# Patient Record
Sex: FEMALE | Race: WHITE | HISPANIC OR LATINO | Employment: UNEMPLOYED | URBAN - METROPOLITAN AREA
[De-identification: names, ages, dates, MRNs, and addresses within clinical notes are randomized per-mention and may not be internally consistent; named-entity substitution may affect disease eponyms.]

---

## 2019-10-16 LAB
ABO + RH BLD: NORMAL
C TRACH RRNA SPEC QL PROBE: NEGATIVE
HBV SURFACE AG SERPL QL IA: NEGATIVE
HIV 1+2 AB+HIV1 P24 AG SERPL QL IA: NEGATIVE
INDIRECT COOMBS: NEGATIVE
INDIRECT COOMBS: NEGATIVE
N GONORRHOEAE, AMPLIFIED DNA: NEGATIVE
RPR: NON REACTIVE
RUBELLA IMMUNE STATUS: NORMAL

## 2019-11-09 ENCOUNTER — HOSPITAL ENCOUNTER (EMERGENCY)
Facility: HOSPITAL | Age: 34
Discharge: HOME | End: 2019-11-10
Attending: EMERGENCY MEDICINE
Payer: MEDICAID

## 2019-11-09 DIAGNOSIS — J01.90 ACUTE NON-RECURRENT SINUSITIS, UNSPECIFIED LOCATION: Primary | ICD-10-CM

## 2019-11-09 DIAGNOSIS — J20.9 ACUTE BRONCHITIS, UNSPECIFIED ORGANISM: ICD-10-CM

## 2019-11-09 LAB
DEPRECATED S PYO AG THROAT QL EIA: NEGATIVE
INFLUENZA A, MOLECULAR: NEGATIVE
INFLUENZA B, MOLECULAR: NEGATIVE
SPECIMEN SOURCE: NORMAL

## 2019-11-09 PROCEDURE — 99282 EMERGENCY DEPT VISIT SF MDM: CPT

## 2019-11-09 PROCEDURE — 87502 INFLUENZA DNA AMP PROBE: CPT

## 2019-11-09 PROCEDURE — 87081 CULTURE SCREEN ONLY: CPT

## 2019-11-09 PROCEDURE — 87880 STREP A ASSAY W/OPTIC: CPT

## 2019-11-10 VITALS
HEART RATE: 89 BPM | SYSTOLIC BLOOD PRESSURE: 95 MMHG | BODY MASS INDEX: 23.29 KG/M2 | WEIGHT: 126.56 LBS | OXYGEN SATURATION: 99 % | RESPIRATION RATE: 16 BRPM | HEIGHT: 62 IN | TEMPERATURE: 99 F | DIASTOLIC BLOOD PRESSURE: 59 MMHG

## 2019-11-10 RX ORDER — ALBUTEROL SULFATE 90 UG/1
1-2 AEROSOL, METERED RESPIRATORY (INHALATION) EVERY 4 HOURS PRN
Qty: 1 INHALER | Refills: 0 | Status: SHIPPED | OUTPATIENT
Start: 2019-11-10 | End: 2019-11-20

## 2019-11-10 RX ORDER — CEFDINIR 300 MG/1
300 CAPSULE ORAL 2 TIMES DAILY
Qty: 14 CAPSULE | Refills: 0 | Status: SHIPPED | OUTPATIENT
Start: 2019-11-10 | End: 2019-11-17

## 2019-11-10 NOTE — ED PROVIDER NOTES
Encounter Date: 11/9/2019       History     Chief Complaint   Patient presents with    Cough    Sore Throat     33-year-old female who is approximately 5 months pregnant presents emergency department with a sore throat and cough.  Patient states that she has had the symptoms for the past week.  Additionally she has sinus congestion.  She denies any fevers or chills. Her daughter has similar symptoms.  She has not been exposed to the flu.  She did not get her influenza vaccine this year.  She does not smoke cigarettes.  She denies any leg swelling or pleuritic chest pain. From an OB standpoint, she has not have any abdominal pain, no vaginal bleeding or discharge. No reported problems with this pregnancy.     [id number] use for communication        Review of patient's allergies indicates:   Allergen Reactions    Amoxicillin Rash     No past medical history on file.  No past surgical history on file.  No family history on file.  Social History     Tobacco Use    Smoking status: Not on file   Substance Use Topics    Alcohol use: Not on file    Drug use: Not on file     Review of Systems   Constitutional: Negative for chills, diaphoresis, fatigue and fever.   HENT: Positive for congestion and sore throat.    Eyes: Negative for visual disturbance.   Respiratory: Positive for cough. Negative for shortness of breath, wheezing and stridor.    Cardiovascular: Negative for chest pain and palpitations.   Gastrointestinal: Negative for abdominal distention, diarrhea, nausea and vomiting.   Genitourinary: Negative for dysuria, frequency, hematuria and urgency.   Musculoskeletal: Negative for back pain.   Skin: Negative for pallor and rash.   Neurological: Negative for weakness, light-headedness, numbness and headaches.   Psychiatric/Behavioral: Negative for confusion.   All other systems reviewed and are negative.      Physical Exam     Initial Vitals [11/09/19 2213]   BP Pulse Resp Temp SpO2   119/85 91 16 98.3 °F  (36.8 °C) 99 %      MAP       --         Physical Exam    Nursing note and vitals reviewed.  Constitutional: She appears well-developed and well-nourished. No distress.   HENT:   Head: Normocephalic and atraumatic.   Dull TMs bilaterally with serous effusion that is worse in the right, erythematous posterior pharynx without edema or exudates   Eyes: EOM are normal.   Neck: Normal range of motion. Neck supple.   No meningismus   Cardiovascular: Normal rate, regular rhythm, normal heart sounds and intact distal pulses.   No murmur heard.  Pulmonary/Chest: Breath sounds normal. She has no wheezes. She has no rhonchi. She has no rales.   Bronchospastic cough   Abdominal: Soft. There is no tenderness.   Musculoskeletal: Normal range of motion. She exhibits no edema.   Lymphadenopathy:     She has no cervical adenopathy.   Neurological: She is alert. She has normal strength. GCS score is 15. GCS eye subscore is 4. GCS verbal subscore is 5. GCS motor subscore is 6.   Skin: Skin is warm and dry. Capillary refill takes less than 2 seconds. No rash noted.   Psychiatric: She has a normal mood and affect.         ED Course   Procedures  Labs Reviewed   THROAT SCREEN, RAPID   CULTURE, STREP A,  THROAT   INFLUENZA A AND B ANTIGEN    Narrative:     Specimen Source->Nasopharyngeal Swab          Imaging Results    None          Medical Decision Making:   ED Management:  33-year-old female presents with upper respiratory symptoms.  Lungs clear.  No indication for chest x-ray.  Flu and strep were negative. Patient has components of both sinusitis and bronchitis.  She has an amoxicillin allergy, thus will treat with cefdinir.  Albuterol inhaler for cough suppression.  OTC antihistamine.  Follow up with OB.  Detailed return precautions were discussed.    Alejandra Banks MD  Emergency Medicine  11/10/2019 12:58 AM                                   Clinical Impression:       ICD-10-CM ICD-9-CM   1. Acute non-recurrent sinusitis,  unspecified location J01.90 461.9   2. Acute bronchitis, unspecified organism J20.9 466.0                             Alejandra Banks MD  11/10/19 0058

## 2019-11-10 NOTE — DISCHARGE INSTRUCTIONS
Take antibiotics as prescribed.  Additionally you may use the albuterol inhaler every 4 hr to help with your cough.  Take an over-the-counter antihistamine such as Zyrtec or Claritin daily to help relieve the fluid in your ears.  Return for worsening symptoms.

## 2019-11-11 LAB — BACTERIA THROAT CULT: NORMAL

## 2020-02-26 ENCOUNTER — HOSPITAL ENCOUNTER (OUTPATIENT)
Facility: HOSPITAL | Age: 35
Discharge: HOME | End: 2020-02-27
Attending: OBSTETRICS & GYNECOLOGY | Admitting: OBSTETRICS & GYNECOLOGY
Payer: MEDICAID

## 2020-02-26 DIAGNOSIS — N93.9 VAGINAL BLEEDING: ICD-10-CM

## 2020-02-26 LAB
APTT PPP: 25.6 SEC (ref 23.6–33.3)
BACTERIA #/AREA URNS HPF: NEGATIVE /HPF
BASOPHILS # BLD AUTO: 0.02 K/UL (ref 0–0.2)
BASOPHILS NFR BLD: 0.2 % (ref 0–1.9)
BILIRUB UR QL STRIP: NEGATIVE
CLARITY UR: CLEAR
COLOR UR: YELLOW
DIFFERENTIAL METHOD: ABNORMAL
EOSINOPHIL # BLD AUTO: 0 K/UL (ref 0–0.5)
EOSINOPHIL NFR BLD: 0.4 % (ref 0–8)
ERYTHROCYTE [DISTWIDTH] IN BLOOD BY AUTOMATED COUNT: 13.2 % (ref 11.5–14.5)
FIBRINOGEN PPP-MCNC: 625 MG/DL (ref 162–449)
GLUCOSE UR QL STRIP: NEGATIVE
HCT VFR BLD AUTO: 34.7 % (ref 37–48.5)
HGB BLD-MCNC: 11.8 G/DL (ref 12–16)
HGB UR QL STRIP: ABNORMAL
HYALINE CASTS #/AREA URNS LPF: 2 /LPF
IMM GRANULOCYTES # BLD AUTO: 0.03 K/UL (ref 0–0.04)
IMM GRANULOCYTES NFR BLD AUTO: 0.4 % (ref 0–0.5)
INR PPP: 1
KETONES UR QL STRIP: NEGATIVE
LEUKOCYTE ESTERASE UR QL STRIP: NEGATIVE
LYMPHOCYTES # BLD AUTO: 1.8 K/UL (ref 1–4.8)
LYMPHOCYTES NFR BLD: 21.6 % (ref 18–48)
MCH RBC QN AUTO: 27.8 PG (ref 27–31)
MCHC RBC AUTO-ENTMCNC: 34 G/DL (ref 32–36)
MCV RBC AUTO: 82 FL (ref 82–98)
MICROSCOPIC COMMENT: ABNORMAL
MONOCYTES # BLD AUTO: 0.6 K/UL (ref 0.3–1)
MONOCYTES NFR BLD: 7.2 % (ref 4–15)
NEUTROPHILS # BLD AUTO: 5.9 K/UL (ref 1.8–7.7)
NEUTROPHILS NFR BLD: 70.2 % (ref 38–73)
NITRITE UR QL STRIP: NEGATIVE
NRBC BLD-RTO: 0 /100 WBC
PH UR STRIP: 7 [PH] (ref 5–8)
PLATELET # BLD AUTO: 242 K/UL (ref 150–350)
PMV BLD AUTO: 11.2 FL (ref 9.2–12.9)
PROT UR QL STRIP: NEGATIVE
PROTHROMBIN TIME: 12.5 SEC (ref 10.6–14.8)
RBC # BLD AUTO: 4.25 M/UL (ref 4–5.4)
RBC #/AREA URNS HPF: 1 /HPF (ref 0–4)
SP GR UR STRIP: 1 (ref 1–1.03)
SQUAMOUS #/AREA URNS HPF: 2 /HPF
URN SPEC COLLECT METH UR: ABNORMAL
UROBILINOGEN UR STRIP-ACNC: NEGATIVE EU/DL
WBC # BLD AUTO: 8.46 K/UL (ref 3.9–12.7)
WBC #/AREA URNS HPF: 1 /HPF (ref 0–5)

## 2020-02-26 PROCEDURE — 85025 COMPLETE CBC W/AUTO DIFF WBC: CPT

## 2020-02-26 PROCEDURE — 85384 FIBRINOGEN ACTIVITY: CPT

## 2020-02-26 PROCEDURE — 81001 URINALYSIS AUTO W/SCOPE: CPT

## 2020-02-26 PROCEDURE — 85610 PROTHROMBIN TIME: CPT

## 2020-02-26 PROCEDURE — 63600175 PHARM REV CODE 636 W HCPCS: Performed by: OBSTETRICS & GYNECOLOGY

## 2020-02-26 PROCEDURE — 85730 THROMBOPLASTIN TIME PARTIAL: CPT

## 2020-02-26 RX ORDER — BETAMETHASONE SODIUM PHOSPHATE AND BETAMETHASONE ACETATE 3; 3 MG/ML; MG/ML
12 INJECTION, SUSPENSION INTRA-ARTICULAR; INTRALESIONAL; INTRAMUSCULAR; SOFT TISSUE ONCE
Status: COMPLETED | OUTPATIENT
Start: 2020-02-26 | End: 2020-02-26

## 2020-02-26 RX ORDER — SODIUM CHLORIDE, SODIUM LACTATE, POTASSIUM CHLORIDE, CALCIUM CHLORIDE 600; 310; 30; 20 MG/100ML; MG/100ML; MG/100ML; MG/100ML
INJECTION, SOLUTION INTRAVENOUS CONTINUOUS
Status: DISCONTINUED | OUTPATIENT
Start: 2020-02-26 | End: 2020-02-27 | Stop reason: HOSPADM

## 2020-02-26 RX ADMIN — SODIUM CHLORIDE, POTASSIUM CHLORIDE, SODIUM LACTATE AND CALCIUM CHLORIDE: 600; 310; 30; 20 INJECTION, SOLUTION INTRAVENOUS at 03:02

## 2020-02-26 RX ADMIN — BETAMETHASONE ACETATE AND BETAMETHASONE SODIUM PHOSPHATE 12 MG: 3; 3 INJECTION, SUSPENSION INTRA-ARTICULAR; INTRALESIONAL; INTRAMUSCULAR; SOFT TISSUE at 04:02

## 2020-02-26 RX ADMIN — SODIUM CHLORIDE, POTASSIUM CHLORIDE, SODIUM LACTATE AND CALCIUM CHLORIDE: 600; 310; 30; 20 INJECTION, SOLUTION INTRAVENOUS at 09:02

## 2020-02-27 VITALS
WEIGHT: 136 LBS | HEART RATE: 114 BPM | RESPIRATION RATE: 18 BRPM | DIASTOLIC BLOOD PRESSURE: 69 MMHG | BODY MASS INDEX: 23.22 KG/M2 | HEIGHT: 64 IN | SYSTOLIC BLOOD PRESSURE: 102 MMHG | TEMPERATURE: 98 F

## 2020-02-27 LAB
CTP QC/QA: YES
RUPTURE OF MEMBRANE: NEGATIVE

## 2020-02-27 PROCEDURE — 63600175 PHARM REV CODE 636 W HCPCS: Performed by: OBSTETRICS & GYNECOLOGY

## 2020-02-27 PROCEDURE — 96372 THER/PROPH/DIAG INJ SC/IM: CPT

## 2020-02-27 PROCEDURE — 99211 OFF/OP EST MAY X REQ PHY/QHP: CPT | Mod: 25

## 2020-02-27 PROCEDURE — 25000003 PHARM REV CODE 250: Performed by: SPECIALIST

## 2020-02-27 RX ORDER — BETAMETHASONE SODIUM PHOSPHATE AND BETAMETHASONE ACETATE 3; 3 MG/ML; MG/ML
12 INJECTION, SUSPENSION INTRA-ARTICULAR; INTRALESIONAL; INTRAMUSCULAR; SOFT TISSUE ONCE
Status: COMPLETED | OUTPATIENT
Start: 2020-02-27 | End: 2020-02-27

## 2020-02-27 RX ORDER — ACETAMINOPHEN 325 MG/1
650 TABLET ORAL EVERY 6 HOURS PRN
Status: DISCONTINUED | OUTPATIENT
Start: 2020-02-27 | End: 2020-02-27 | Stop reason: HOSPADM

## 2020-02-27 RX ADMIN — ACETAMINOPHEN 650 MG: 325 TABLET ORAL at 02:02

## 2020-02-27 RX ADMIN — SODIUM CHLORIDE, POTASSIUM CHLORIDE, SODIUM LACTATE AND CALCIUM CHLORIDE: 600; 310; 30; 20 INJECTION, SOLUTION INTRAVENOUS at 04:02

## 2020-02-27 RX ADMIN — BETAMETHASONE ACETATE AND BETAMETHASONE SODIUM PHOSPHATE 12 MG: 3; 3 INJECTION, SUSPENSION INTRA-ARTICULAR; INTRALESIONAL; INTRAMUSCULAR; SOFT TISSUE at 08:02

## 2020-02-27 NOTE — NURSING
1920 - RN to pt bedside, pt with no complaints. HENNA well, pt up to bathroom, still having bloody red mucous with wiping. Pt feels ctx but overall comfortable.     2030 - Pt up to void, still having bloody red mucous on tissue with wiping. Denies pain at this time. Pt's  and 2 children now here with pt.    2145 - Pt up to bathroom, still has bloody red mucous with wiping. Mesh panties and pads given, bed linens brought in for family,pt voices no needs.    2255 - Pt up to bathroom, bloody mucous on tissue. Voices no other needs at this time.    0025 - Pt up to bathroom, voices no needs at this time.    0135 - Pt up to bathroom, bloody mucous on tissue with wiping.    0215 - Pt c/o slight headache, Tylenol 650mg PO given @ 0225.     0245 - Pt up to bathroom.

## 2020-02-27 NOTE — NURSING
0755 - ROM plus collected    0806 - Dr. Tuttle at bedside, Luisa  #[id number] used, POC reviewed, ROM plus results negative, vaginal exam with speculum performed by Dr. Tuttle, cervix 1/thick/high, MD gave orders to give 2nd celestone injection now and discharge patient home

## 2020-02-27 NOTE — PROGRESS NOTES
[facility]  Obstetrics  Antepartum Progress Note    Patient Name: Katja Trujillo  MRN: [id number]  Admission Date: 2020  Hospital Length of Stay: 0 days  Attending Physician: Jenny Tuttle MD  Primary Care Provider: Jenny Tuttle MD    Subjective:     Principal Problem:Vaginal bleeding    HPI:  No notes on file    Hospital Course:  No notes on file    Obstetric HPI:  35yo  who presented to the office this morning for her routine OB visit.  She doesn't speak English and complaints were communicated through her .  She reported discharge and back pain, ffn was collected and returned back as negative.  Cervix at that time was 1cm/thick    She arrives at L&D about 3pm and said she spotted after I checked her, then when she got hmoe she soaked a pad. Upon arrival, she has only light spotting.  She is jossue every 4-7 minutes.  The plan is to give betamethasone, hydrate, check coagulation labs, observe.    Her contractions have eventually subsided, but pt still has almond size spot on panties.  Coag labs and CBC are wnl.  She is uncomfortable going home, she has 2 young children to care for.  She doesn't want to come back for another NST or betamethasone tomorrow.    Shall keep her overnight, give betamethasone in the AM     Objective:     Vital Signs (Most Recent):    Vital Signs (24h Range):           There is no height or weight on file to calculate BMI.    FHT: 130'sCat 1 (reassuring)    No intake or output data in the 24 hours ending 20 1833    Cervical Exam:deferred at the hospital      Significant Labs:  Recent Lab Results       20  1545   20  1446        Appearance, UA   Clear     aPTT 25.6  Comment:  Therapeutic Range of 67.5-105.1 secs.  Equivalent to Heparin Concentration of anti-Xa 0.3-0.7 IU/ml.         Bacteria, UA   Negative     Baso # 0.02       Basophil% 0.2       Bilirubin (UA)   Negative     Color, UA   Yellow     Differential Method  Automated       Eos # 0.0       Eosinophil% 0.4       Fibrinogen 625       Glucose, UA   Negative     Gran # (ANC) 5.9       Gran% 70.2       Hematocrit 34.7       Hemoglobin 11.8       Hyaline Casts, UA   2     Immature Grans (Abs) 0.03  Comment:  Mild elevation in immature granulocytes is non specific and   can be seen in a variety of conditions including stress response,   acute inflammation, trauma and pregnancy. Correlation with other   laboratory and clinical findings is essential.         Immature Granulocytes 0.4       INR 1.0  Comment:  Coumadin Therapy:  INR: 2.0-3.0 conventional anticoagulation  INR: 2.5-3.5 intensive anticoagulation         Ketones, UA   Negative     Leukocytes, UA   Negative     Lymph # 1.8       Lymph% 21.6       MCH 27.8       MCHC 34.0       MCV 82       Microscopic Comment   SEE COMMENT  Comment:  Other formed elements not mentioned in the report are not   present in the microscopic examination.        Mono # 0.6       Mono% 7.2       MPV 11.2       NITRITE UA   Negative     nRBC 0       Occult Blood UA   2+     pH, UA   7.0     Platelets 242       Protein, UA   Negative  Comment:  Recommend a 24 hour urine protein or a urine   protein/creatinine ratio if globulin induced proteinuria is  clinically suspected.       PT 12.5       RBC 4.25       RBC, UA   1     RDW 13.2       Specific Gravity, UA   1.005     Specimen UA   Urine, Clean Catch     Squam Epithel, UA   2     UROBILINOGEN UA   Negative     WBC, UA   1     WBC 8.46                 Assessment/Plan:     34 y.o. female  at 33w3d for:    * Vaginal bleeding  Observe overnight  Continuous fetal monitoring  Probable DC in the AM          Jenny Tuttle MD  Obstetrics  [facility]

## 2020-02-27 NOTE — SUBJECTIVE & OBJECTIVE
Obstetric HPI:  33yo  who presented to the office this morning for her routine OB visit.  She doesn't speak English and complaints were communicated through her .  She reported discharge and back pain, ffn was collected and returned back as negative.  Cervix at that time was 1cm/thick    She arrives at L&D about 3pm and said she spotted after I checked her, then when she got hmoe she soaked a pad. Upon arrival, she has only light spotting.  She is jossue every 4-7 minutes.  The plan is to give betamethasone, hydrate, check coagulation labs, observe.    Her contractions have eventually subsided, but pt still has almond size spot on panties.  Coag labs and CBC are wnl.  She is uncomfortable going home, she has 2 young children to care for.  She doesn't want to come back for another NST or betamethasone tomorrow.    Shall keep her overnight, give betamethasone in the AM     Objective:     Vital Signs (Most Recent):    Vital Signs (24h Range):           There is no height or weight on file to calculate BMI.    FHT: 130'sCat 1 (reassuring)    No intake or output data in the 24 hours ending 20 1833    Cervical Exam:deferred at the hospital      Significant Labs:  Recent Lab Results       20  1545   20  1446        Appearance, UA   Clear     aPTT 25.6  Comment:  Therapeutic Range of 67.5-105.1 secs.  Equivalent to Heparin Concentration of anti-Xa 0.3-0.7 IU/ml.         Bacteria, UA   Negative     Baso # 0.02       Basophil% 0.2       Bilirubin (UA)   Negative     Color, UA   Yellow     Differential Method Automated       Eos # 0.0       Eosinophil% 0.4       Fibrinogen 625       Glucose, UA   Negative     Gran # (ANC) 5.9       Gran% 70.2       Hematocrit 34.7       Hemoglobin 11.8       Hyaline Casts, UA   2     Immature Grans (Abs) 0.03  Comment:  Mild elevation in immature granulocytes is non specific and   can be seen in a variety of conditions including stress response,   acute  inflammation, trauma and pregnancy. Correlation with other   laboratory and clinical findings is essential.         Immature Granulocytes 0.4       INR 1.0  Comment:  Coumadin Therapy:  INR: 2.0-3.0 conventional anticoagulation  INR: 2.5-3.5 intensive anticoagulation         Ketones, UA   Negative     Leukocytes, UA   Negative     Lymph # 1.8       Lymph% 21.6       MCH 27.8       MCHC 34.0       MCV 82       Microscopic Comment   SEE COMMENT  Comment:  Other formed elements not mentioned in the report are not   present in the microscopic examination.        Mono # 0.6       Mono% 7.2       MPV 11.2       NITRITE UA   Negative     nRBC 0       Occult Blood UA   2+     pH, UA   7.0     Platelets 242       Protein, UA   Negative  Comment:  Recommend a 24 hour urine protein or a urine   protein/creatinine ratio if globulin induced proteinuria is  clinically suspected.       PT 12.5       RBC 4.25       RBC, UA   1     RDW 13.2       Specific Gravity, UA   1.005     Specimen UA   Urine, Clean Catch     Squam Epithel, UA   2     UROBILINOGEN UA   Negative     WBC, UA   1     WBC 8.46

## 2020-02-27 NOTE — DISCHARGE INSTRUCTIONS
Discharge Instructions    Keep scheduled appointment!    Self Care Instructions:    Diet:  · Eat from the five basic food groups  · Fruits and proteins are good choices  · Limit fast foods and added salt/sugar  · Moderate carbonated and caffeine drinks    Hydration:  · Drink at least 8 large glasses of water a day    Kick Counts:  · After a meal, rest on your side and note the baby's movements until you have 8-10 movements in a 2 hour counting period.    · If you do not feel your baby move 8-10 times within 2 hours or you sense a change in the type or character of the baby's movement, you should come in to the hospital at once.  · Remember; your baby can sleep for 20-40 minutes at a time.      When to notify your provider:    · Vaginal bleeding like a period;  You may spot if we examined your cervix.  · If your water breaks, come to the birth center.  Note time, color and odor.  · Abdominal tenderness or pain that does not go away  · Contractions every 3 to 5 minutes for 1 to 2 hours.  True contractions move from front to back, are regular; usually get longer, stronger and closer together and do not stop if you change your position or activity.  · Any burning, urgency or frequency in relation to emptying your bladder.  · Any temperature greater than 100.4 degrees, chills, flu-like symptoms

## 2020-02-27 NOTE — HOSPITAL COURSE
33yo  who presented to the office this morning for her routine OB visit.  She doesn't speak English and complaints were communicated through her .  She reported discharge and back pain, ffn was collected and returned back as negative.  Cervix at that time was 1cm/thick    She arrives at L&D about 3pm and said she spotted after I checked her, then when she got hmoe she soaked a pad. Upon arrival, she has only light spotting.  She is jossue every 4-7 minutes.  The plan is to give betamethasone, hydrate, check coagulation labs, observe.    Her contractions have eventually subsided, but pt still has almond size spot on panties.  Coag labs and CBC are wnl.  She is uncomfortable going home, she has 2 young children to care for.  She doesn't want to come back for another NST or betamethasone tomorrow.    Shall keep her overnight, give betamethasone in the AM

## 2020-02-27 NOTE — NURSING
No IV access charted in Epic flowsheet.  Patient had an 18g in Left FA.  Removed prior to d/c.  Catheter intact.

## 2020-02-27 NOTE — NURSING
"Patient arrived to unit c/o vaginal bleeding. Was seen in office today with Dr. Tuttle    used for Communication as patient is [place] speaking and does not understand any English.    1445-Pt given room orientation, gown and collected a Urine Sample.   Pad shown to RN. Moderate/Large bleeding noted on pad from home. New pad was given. Soft Abd, non tender. Patient states that she has occasional feelings of tightness.   Denies recent sexual activity.   No noted leaking of fluid on pad, just blood.   Triage done using .    1527- Dr. Tuttle given report and orders given to obtain CBC, PTT/PT, Fibrinogen, start IV and have LR at 250 ml/hr. Celestone injection 12 mg IM 24 hours apart.   1610-Patient up to bathroom- scant amount in pad noted.  Bright Small red mucus noted on toilet tissue. New pad given .   1657- Dr. Tuttle called and updated on patient status.   1735- Dr. Tuttle called and gave orders to assess patient to see if she is comfortable being discharged and educated patient on parameters and what to look for and when to seek medical attention if and when she goes home.   1751- Patient up to bathroom. Scant amount on pad. Mucus red tinge discharge on toilet tissue. New pad given     1757- called to clarify for patient " IF she had to stay until 400 pm for next shot, can it be given early"   Clarified with Provider that patient is anxious and that there are possibly going to be transportation issues when coming back tomorrow for second Celestone shot. Patient not sure if she feels comfortable especially with the bleeding and contractions.   Cleared with Dr. Tuttle for patient to stay the night, Nurse Manager called for clarification of family staying in the room.     1815- Patient updated on status and informed using  that patient can stay. Patient thankful.    1830- On call provider, Dr. Johnston called to clarify patient diet order. Orders given for regular diet. LR to " be at 125 ml/Hr. Primary Provider will reevaluate in the am .     1853- Report given to oncoming night shift.

## 2020-02-27 NOTE — DISCHARGE SUMMARY
[facility]  Obstetrics  Discharge Summary      Patient Name: Katja Trujillo  MRN: [id number]  Admission Date: 2020  Hospital Length of Stay: 0 days  Discharge Date and Time:  2020 8:15 AM  Attending Physician: Jenny Tuttle MD   Discharging Provider: Jenny Tuttle MD   Primary Care Provider: Jenny Tuttle MD    HPI: No notes on file    FHT: 130'sCat 1 (reassuring)    * No surgery found *     Hospital Course:   33yo  who presented to the office this morning for her routine OB visit.  She doesn't speak English and complaints were communicated through her .  She reported discharge and back pain, ffn was collected and returned back as negative.  Cervix at that time was 1cm/thick    She arrives at L&D about 3pm and said she spotted after I checked her, then when she got hmoe she soaked a pad. Upon arrival, she has only light spotting.  She is jossue every 4-7 minutes.  The plan is to give betamethasone, hydrate, check coagulation labs, observe.    Her contractions have eventually subsided, but pt still has almond size spot on panties.  Coag labs and CBC are wnl.  She is uncomfortable going home, she has 2 young children to care for.  She doesn't want to come back for another NST or betamethasone tomorrow.    Shall keep her overnight, give betamethasone in the AM    Pt had sporadic contractions during the night, and bleeding continued to lighten up.  This AM, her pad had scant specs of blood on it from the last 2 hrs.  U/S shows vertex fetus, anterior placenta, HANH 14cm and ROM + is negative.  Her cervix is unchanged from yesterday at 1cm/thick.   Pt reports she has the same amount of contractions as she has at home.  She will be given her second betamethasone injection today before she leaves.  Through ARMANI , she feels comfortable to be discharged.       Final Active Diagnoses:    Diagnosis Date Noted POA    PRINCIPAL PROBLEM:  Vaginal bleeding  [N93.9] 02/26/2020 Yes      Problems Resolved During this Admission:        Labs:   CBC   Recent Labs   Lab 02/26/20  1545   WBC 8.46   HGB 11.8*   HCT 34.7*          Immunizations     None          This patient has no babies on file.  Pending Diagnostic Studies:     None          Discharged Condition: good    Disposition: Home or Self Care    Follow Up:  Follow-up Information     Jenny Tuttle MD In 2 weeks.    Specialties:  Obstetrics, Obstetrics and Gynecology, Maternal and Fetal Medicine  Contact information:  [address] [place] [address] [phone]  [phone]                 Patient Instructions:      Diet Adult Regular     Pelvic Rest     Medications:  Current Discharge Medication List      CONTINUE these medications which have NOT CHANGED    Details   albuterol (PROVENTIL/VENTOLIN HFA) 90 mcg/actuation inhaler Inhale 1-2 puffs into the lungs every 4 (four) hours as needed for Wheezing (cough). Rescue  Qty: 1 Inhaler, Refills: 0             Jenny Tuttle MD  Obstetrics  [facility]

## 2020-03-11 LAB — PRENATAL STREP B CULTURE: POSITIVE

## 2020-03-20 ENCOUNTER — ANESTHESIA EVENT (OUTPATIENT)
Dept: OBSTETRICS AND GYNECOLOGY | Facility: HOSPITAL | Age: 35
End: 2020-03-20
Payer: MEDICAID

## 2020-03-20 ENCOUNTER — HOSPITAL ENCOUNTER (INPATIENT)
Facility: HOSPITAL | Age: 35
LOS: 2 days | Discharge: HOME | End: 2020-03-22
Attending: OBSTETRICS & GYNECOLOGY | Admitting: OBSTETRICS & GYNECOLOGY
Payer: MEDICAID

## 2020-03-20 ENCOUNTER — ANESTHESIA (OUTPATIENT)
Dept: OBSTETRICS AND GYNECOLOGY | Facility: HOSPITAL | Age: 35
End: 2020-03-20
Payer: MEDICAID

## 2020-03-20 DIAGNOSIS — Z78.9 ADMITTED TO LABOR AND DELIVERY: ICD-10-CM

## 2020-03-20 DIAGNOSIS — O42.019 PRETERM PREMATURE RUPTURE OF MEMBRANES WITH ONSET OF LABOR WITHIN 24 HOURS OF RUPTURE: ICD-10-CM

## 2020-03-20 PROBLEM — N93.9 VAGINAL BLEEDING: Status: RESOLVED | Noted: 2020-02-26 | Resolved: 2020-03-20

## 2020-03-20 LAB
ABO + RH BLD: NORMAL
AMPHET+METHAMPHET UR QL: NEGATIVE
BACTERIA #/AREA URNS HPF: NEGATIVE /HPF
BARBITURATES UR QL SCN>200 NG/ML: NEGATIVE
BASOPHILS # BLD AUTO: 0.02 K/UL (ref 0–0.2)
BASOPHILS NFR BLD: 0.3 % (ref 0–1.9)
BENZODIAZ UR QL SCN>200 NG/ML: NEGATIVE
BILIRUB UR QL STRIP: NEGATIVE
BLD GP AB SCN CELLS X3 SERPL QL: NORMAL
BZE UR QL SCN: NEGATIVE
CANNABINOIDS UR QL SCN: NEGATIVE
CLARITY UR: ABNORMAL
COLOR UR: ABNORMAL
CREAT UR-MCNC: 28 MG/DL (ref 15–325)
DIFFERENTIAL METHOD: NORMAL
EOSINOPHIL # BLD AUTO: 0 K/UL (ref 0–0.5)
EOSINOPHIL NFR BLD: 0.4 % (ref 0–8)
ERYTHROCYTE [DISTWIDTH] IN BLOOD BY AUTOMATED COUNT: 13.5 % (ref 11.5–14.5)
GLUCOSE UR QL STRIP: NEGATIVE
HCT VFR BLD AUTO: 40.3 % (ref 37–48.5)
HGB BLD-MCNC: 13.3 G/DL (ref 12–16)
HGB UR QL STRIP: ABNORMAL
HYALINE CASTS #/AREA URNS LPF: 275 /LPF
IMM GRANULOCYTES # BLD AUTO: 0.02 K/UL (ref 0–0.04)
IMM GRANULOCYTES NFR BLD AUTO: 0.3 % (ref 0–0.5)
KETONES UR QL STRIP: NEGATIVE
LEUKOCYTE ESTERASE UR QL STRIP: ABNORMAL
LYMPHOCYTES # BLD AUTO: 2.4 K/UL (ref 1–4.8)
LYMPHOCYTES NFR BLD: 29.9 % (ref 18–48)
MCH RBC QN AUTO: 27.1 PG (ref 27–31)
MCHC RBC AUTO-ENTMCNC: 33 G/DL (ref 32–36)
MCV RBC AUTO: 82 FL (ref 82–98)
MICROSCOPIC COMMENT: ABNORMAL
MONOCYTES # BLD AUTO: 0.5 K/UL (ref 0.3–1)
MONOCYTES NFR BLD: 6.3 % (ref 4–15)
NEUTROPHILS # BLD AUTO: 5 K/UL (ref 1.8–7.7)
NEUTROPHILS NFR BLD: 62.8 % (ref 38–73)
NITRITE UR QL STRIP: NEGATIVE
NRBC BLD-RTO: 0 /100 WBC
OPIATES UR QL SCN: NEGATIVE
PCP UR QL SCN>25 NG/ML: NEGATIVE
PCP UR QL SCN>25 NG/ML: NEGATIVE
PH UR STRIP: 8 [PH] (ref 5–8)
PLATELET # BLD AUTO: 224 K/UL (ref 150–350)
PMV BLD AUTO: 12 FL (ref 9.2–12.9)
PROT UR QL STRIP: ABNORMAL
RBC # BLD AUTO: 4.91 M/UL (ref 4–5.4)
RBC #/AREA URNS HPF: >100 /HPF (ref 0–4)
RPR SER QL: NORMAL
SP GR UR STRIP: 1 (ref 1–1.03)
SQUAMOUS #/AREA URNS HPF: 17 /HPF
TOXICOLOGY INFORMATION: NORMAL
URN SPEC COLLECT METH UR: ABNORMAL
UROBILINOGEN UR STRIP-ACNC: NEGATIVE EU/DL
WBC # BLD AUTO: 7.96 K/UL (ref 3.9–12.7)
WBC #/AREA URNS HPF: 88 /HPF (ref 0–5)

## 2020-03-20 PROCEDURE — 86900 BLOOD TYPING SEROLOGIC ABO: CPT

## 2020-03-20 PROCEDURE — 63600175 PHARM REV CODE 636 W HCPCS: Performed by: ANESTHESIOLOGY

## 2020-03-20 PROCEDURE — 87086 URINE CULTURE/COLONY COUNT: CPT

## 2020-03-20 PROCEDURE — C1751 CATH, INF, PER/CENT/MIDLINE: HCPCS | Performed by: ANESTHESIOLOGY

## 2020-03-20 PROCEDURE — 25000003 PHARM REV CODE 250: Performed by: OBSTETRICS & GYNECOLOGY

## 2020-03-20 PROCEDURE — 63600175 PHARM REV CODE 636 W HCPCS: Performed by: OBSTETRICS & GYNECOLOGY

## 2020-03-20 PROCEDURE — 62326 NJX INTERLAMINAR LMBR/SAC: CPT | Performed by: ANESTHESIOLOGY

## 2020-03-20 PROCEDURE — 80307 DRUG TEST PRSMV CHEM ANLYZR: CPT

## 2020-03-20 PROCEDURE — 86592 SYPHILIS TEST NON-TREP QUAL: CPT

## 2020-03-20 PROCEDURE — 81001 URINALYSIS AUTO W/SCOPE: CPT

## 2020-03-20 PROCEDURE — 85025 COMPLETE CBC W/AUTO DIFF WBC: CPT

## 2020-03-20 PROCEDURE — 12000002 HC ACUTE/MED SURGE SEMI-PRIVATE ROOM

## 2020-03-20 PROCEDURE — 72200004 HC VAGINAL DELIVERY LEVEL I

## 2020-03-20 PROCEDURE — 25000003 PHARM REV CODE 250: Performed by: ANESTHESIOLOGY

## 2020-03-20 RX ORDER — CEFAZOLIN SODIUM 2 G/50ML
2 SOLUTION INTRAVENOUS ONCE
Status: COMPLETED | OUTPATIENT
Start: 2020-03-20 | End: 2020-03-20

## 2020-03-20 RX ORDER — FENTANYL/BUPIVACAINE/NS/PF 2MCG/ML-.1
PLASTIC BAG, INJECTION (ML) INJECTION CONTINUOUS PRN
Status: DISCONTINUED | OUTPATIENT
Start: 2020-03-20 | End: 2020-03-20

## 2020-03-20 RX ORDER — CALCIUM CARBONATE 200(500)MG
500 TABLET,CHEWABLE ORAL 3 TIMES DAILY PRN
Status: DISCONTINUED | OUTPATIENT
Start: 2020-03-20 | End: 2020-03-20

## 2020-03-20 RX ORDER — OXYCODONE AND ACETAMINOPHEN 5; 325 MG/1; MG/1
1 TABLET ORAL EVERY 4 HOURS PRN
Status: DISCONTINUED | OUTPATIENT
Start: 2020-03-20 | End: 2020-03-22 | Stop reason: HOSPADM

## 2020-03-20 RX ORDER — SODIUM CHLORIDE, SODIUM LACTATE, POTASSIUM CHLORIDE, CALCIUM CHLORIDE 600; 310; 30; 20 MG/100ML; MG/100ML; MG/100ML; MG/100ML
INJECTION, SOLUTION INTRAVENOUS CONTINUOUS
Status: DISCONTINUED | OUTPATIENT
Start: 2020-03-20 | End: 2020-03-20

## 2020-03-20 RX ORDER — ACETAMINOPHEN 325 MG/1
650 TABLET ORAL EVERY 6 HOURS PRN
Status: DISCONTINUED | OUTPATIENT
Start: 2020-03-20 | End: 2020-03-22 | Stop reason: HOSPADM

## 2020-03-20 RX ORDER — OXYTOCIN-SODIUM CHLORIDE 0.9% IV SOLN 30 UNIT/500ML 30-0.9/5 UT/ML-%
2 SOLUTION INTRAVENOUS CONTINUOUS
Status: DISCONTINUED | OUTPATIENT
Start: 2020-03-20 | End: 2020-03-20

## 2020-03-20 RX ORDER — IBUPROFEN 400 MG/1
800 TABLET ORAL EVERY 6 HOURS PRN
Status: DISCONTINUED | OUTPATIENT
Start: 2020-03-20 | End: 2020-03-22 | Stop reason: HOSPADM

## 2020-03-20 RX ORDER — OXYCODONE AND ACETAMINOPHEN 10; 325 MG/1; MG/1
1 TABLET ORAL EVERY 4 HOURS PRN
Status: DISCONTINUED | OUTPATIENT
Start: 2020-03-20 | End: 2020-03-22 | Stop reason: HOSPADM

## 2020-03-20 RX ORDER — SODIUM CHLORIDE 0.9 % (FLUSH) 0.9 %
10 SYRINGE (ML) INJECTION
Status: DISCONTINUED | OUTPATIENT
Start: 2020-03-20 | End: 2020-03-20

## 2020-03-20 RX ORDER — SODIUM CHLORIDE 9 MG/ML
INJECTION, SOLUTION INTRAVENOUS
Status: DISCONTINUED | OUTPATIENT
Start: 2020-03-20 | End: 2020-03-20

## 2020-03-20 RX ORDER — MEPERIDINE HYDROCHLORIDE 50 MG/ML
12.5 INJECTION INTRAMUSCULAR; INTRAVENOUS; SUBCUTANEOUS EVERY 10 MIN PRN
Status: DISCONTINUED | OUTPATIENT
Start: 2020-03-20 | End: 2020-03-20

## 2020-03-20 RX ORDER — ALBUTEROL SULFATE 90 UG/1
2 AEROSOL, METERED RESPIRATORY (INHALATION) EVERY 8 HOURS
Status: DISCONTINUED | OUTPATIENT
Start: 2020-03-20 | End: 2020-03-22

## 2020-03-20 RX ORDER — OXYCODONE HYDROCHLORIDE 5 MG/1
5 TABLET ORAL
Status: DISCONTINUED | OUTPATIENT
Start: 2020-03-20 | End: 2020-03-20

## 2020-03-20 RX ORDER — DOCUSATE SODIUM 100 MG/1
200 CAPSULE, LIQUID FILLED ORAL 2 TIMES DAILY PRN
Status: DISCONTINUED | OUTPATIENT
Start: 2020-03-20 | End: 2020-03-22 | Stop reason: HOSPADM

## 2020-03-20 RX ORDER — ONDANSETRON 4 MG/1
8 TABLET, ORALLY DISINTEGRATING ORAL EVERY 8 HOURS PRN
Status: DISCONTINUED | OUTPATIENT
Start: 2020-03-20 | End: 2020-03-22 | Stop reason: HOSPADM

## 2020-03-20 RX ORDER — HYDROMORPHONE HYDROCHLORIDE 1 MG/ML
0.2 INJECTION, SOLUTION INTRAMUSCULAR; INTRAVENOUS; SUBCUTANEOUS
Status: DISCONTINUED | OUTPATIENT
Start: 2020-03-20 | End: 2020-03-20

## 2020-03-20 RX ORDER — DIPHENHYDRAMINE HYDROCHLORIDE 50 MG/ML
12.5 INJECTION INTRAMUSCULAR; INTRAVENOUS
Status: DISCONTINUED | OUTPATIENT
Start: 2020-03-20 | End: 2020-03-20

## 2020-03-20 RX ORDER — CEFAZOLIN SODIUM 1 G/50ML
1 SOLUTION INTRAVENOUS
Status: DISCONTINUED | OUTPATIENT
Start: 2020-03-20 | End: 2020-03-20

## 2020-03-20 RX ORDER — ROPIVACAINE HYDROCHLORIDE 2 MG/ML
INJECTION, SOLUTION EPIDURAL; INFILTRATION
Status: DISCONTINUED | OUTPATIENT
Start: 2020-03-20 | End: 2020-03-20

## 2020-03-20 RX ORDER — ONDANSETRON 2 MG/ML
4 INJECTION INTRAMUSCULAR; INTRAVENOUS DAILY PRN
Status: DISCONTINUED | OUTPATIENT
Start: 2020-03-20 | End: 2020-03-20

## 2020-03-20 RX ORDER — BUTORPHANOL TARTRATE 2 MG/ML
1 INJECTION INTRAMUSCULAR; INTRAVENOUS
Status: DISCONTINUED | OUTPATIENT
Start: 2020-03-20 | End: 2020-03-20

## 2020-03-20 RX ORDER — DIPHENHYDRAMINE HCL 25 MG
25 CAPSULE ORAL EVERY 4 HOURS PRN
Status: DISCONTINUED | OUTPATIENT
Start: 2020-03-20 | End: 2020-03-22 | Stop reason: HOSPADM

## 2020-03-20 RX ORDER — AMOXICILLIN 250 MG
1 CAPSULE ORAL NIGHTLY
Status: DISCONTINUED | OUTPATIENT
Start: 2020-03-20 | End: 2020-03-22 | Stop reason: HOSPADM

## 2020-03-20 RX ORDER — CEFAZOLIN SODIUM 2 G/50ML
2 SOLUTION INTRAVENOUS ONCE
Status: DISCONTINUED | OUTPATIENT
Start: 2020-03-20 | End: 2020-03-20

## 2020-03-20 RX ORDER — HYDROCORTISONE 25 MG/G
CREAM TOPICAL 3 TIMES DAILY PRN
Status: DISCONTINUED | OUTPATIENT
Start: 2020-03-20 | End: 2020-03-22 | Stop reason: HOSPADM

## 2020-03-20 RX ORDER — PRENATAL WITH FERROUS FUM AND FOLIC ACID 3080; 920; 120; 400; 22; 1.84; 3; 20; 10; 1; 12; 200; 27; 25; 2 [IU]/1; [IU]/1; MG/1; [IU]/1; MG/1; MG/1; MG/1; MG/1; MG/1; MG/1; UG/1; MG/1; MG/1; MG/1; MG/1
1 TABLET ORAL DAILY
Status: DISCONTINUED | OUTPATIENT
Start: 2020-03-20 | End: 2020-03-22 | Stop reason: HOSPADM

## 2020-03-20 RX ORDER — ONDANSETRON 2 MG/ML
4 INJECTION INTRAMUSCULAR; INTRAVENOUS EVERY 6 HOURS PRN
Status: DISCONTINUED | OUTPATIENT
Start: 2020-03-20 | End: 2020-03-20

## 2020-03-20 RX ADMIN — BENZOCAINE AND LEVOMENTHOL: 200; 5 SPRAY TOPICAL at 05:03

## 2020-03-20 RX ADMIN — ROPIVACAINE HYDROCHLORIDE 5 ML: 2 INJECTION, SOLUTION EPIDURAL; INFILTRATION at 10:03

## 2020-03-20 RX ADMIN — SENNOSIDES AND DOCUSATE SODIUM 1 TABLET: 8.6; 5 TABLET ORAL at 10:03

## 2020-03-20 RX ADMIN — SODIUM CHLORIDE, POTASSIUM CHLORIDE, SODIUM LACTATE AND CALCIUM CHLORIDE 300 ML: 600; 310; 30; 20 INJECTION, SOLUTION INTRAVENOUS at 08:03

## 2020-03-20 RX ADMIN — SODIUM CHLORIDE, POTASSIUM CHLORIDE, SODIUM LACTATE AND CALCIUM CHLORIDE: 600; 310; 30; 20 INJECTION, SOLUTION INTRAVENOUS at 10:03

## 2020-03-20 RX ADMIN — CEFAZOLIN SODIUM 2 G: 2 SOLUTION INTRAVENOUS at 04:03

## 2020-03-20 RX ADMIN — OXYCODONE HYDROCHLORIDE AND ACETAMINOPHEN 1 TABLET: 5; 325 TABLET ORAL at 07:03

## 2020-03-20 RX ADMIN — Medication: at 05:03

## 2020-03-20 RX ADMIN — ROPIVACAINE HYDROCHLORIDE 5 ML: 2 INJECTION, SOLUTION EPIDURAL; INFILTRATION at 09:03

## 2020-03-20 RX ADMIN — SODIUM CHLORIDE, SODIUM LACTATE, POTASSIUM CHLORIDE, AND CALCIUM CHLORIDE 1000 ML: .6; .31; .03; .02 INJECTION, SOLUTION INTRAVENOUS at 09:03

## 2020-03-20 RX ADMIN — CEFAZOLIN SODIUM 1 G: 1 SOLUTION INTRAVENOUS at 12:03

## 2020-03-20 RX ADMIN — IBUPROFEN 800 MG: 400 TABLET, FILM COATED ORAL at 04:03

## 2020-03-20 RX ADMIN — Medication 2 MILLI-UNITS/MIN: at 06:03

## 2020-03-20 RX ADMIN — Medication 12 ML/HR: at 09:03

## 2020-03-20 RX ADMIN — SODIUM CHLORIDE, POTASSIUM CHLORIDE, SODIUM LACTATE AND CALCIUM CHLORIDE: 600; 310; 30; 20 INJECTION, SOLUTION INTRAVENOUS at 04:03

## 2020-03-20 NOTE — NURSING
PT TO UNIT VIA W/C FROM L&D.  KILO  USED TO COMMUNICATE WITH PT.  ID # [id number].  PT STATES UNDERSTANDING OF ALL TEACHING & PLAN OF CARE.  ARMANI VANG @ BS.

## 2020-03-20 NOTE — SUBJECTIVE & OBJECTIVE
Interval History:  Katja is a 34 y.o.  at 36w5d. She is doing well. On pitocin.    Objective:     Vital Signs (Most Recent):  Temp: 99 °F (37.2 °C) (20 07)  Pulse: 89 (20 0651)  Resp: 16 (20 07)  BP: 104/69 (20 0651) Vital Signs (24h Range):  Temp:  [98.2 °F (36.8 °C)-99 °F (37.2 °C)] 99 °F (37.2 °C)  Pulse:  [78-89] 89  Resp:  [16-18] 16  BP: ()/(61-79) 104/69     Weight: 61.2 kg (135 lb)  Body mass index is 26.37 kg/m².    FHT: 130'sCat 1 (reassuring)  TOCO:  Q 2-6 minutes    Cervical Exam:  Dilation:  3  Effacement:  80%  Station: -2  Presentation: Vertex     Significant Labs:  Lab Results   Component Value Date    GROUPTRH B POS 2020    HEPBSAG Negative 10/16/2019    STREPBCULT positive 2020       I have personallly reviewed all pertinent lab results from the last 24 hours.    Physical Exam

## 2020-03-20 NOTE — HOSPITAL COURSE
33yo  admitted with SROM at 36.5 wks. Started on pitocin. Received an epidural for her labor.She underwent  without any complications. She is GBBS positive.

## 2020-03-20 NOTE — ANESTHESIA PROCEDURE NOTES
Epidural    Patient location during procedure: OB   Reason for block: primary anesthetic   Diagnosis: IUP   Start time: 3/20/2020 9:41 AM  Timeout: 3/20/2020 9:40 AM  End time: 3/20/2020 9:51 AM    Staffing  Performing Provider: David Murray MD  Authorizing Provider: David Murray MD        Preanesthetic Checklist  Completed: patient identified, site marked, surgical consent, pre-op evaluation, timeout performed, IV checked, risks and benefits discussed, monitors and equipment checked, anesthesia consent given, hand hygiene performed and patient being monitored  Preparation  Patient position: sitting  Prep: Betadine  Patient monitoring: ECG and Blood Pressure  Epidural  Skin Anesthetic: lidocaine 1%  Skin Wheal: 3 mL  Administration type: continuous  Approach: midline  Interspace: L3-4    Injection technique: BERNABE air  Needle and Epidural Catheter  Needle type: Tuohy   Needle gauge: 17  Needle length: 3.5 inches  Needle insertion depth: 8 cm  Catheter type: springwound and multi-orifice  Catheter size: 19 G  Catheter at skin depth: 12 cm  Test dose: 3 mL of lidocaine 1.5% with Epi 1-to-200,000  Additional Documentation: no paresthesia on injection, negative aspiration for heme and CSF, no significant pain on injection and no significant complaints from patient  Needle localization: anatomical landmarks  Medications:  Volume per aspiration: 5 mL  Time between aspirations: 5 minutes  Assessment  Upper dermatomal levels - Left: T6  Right: T6   Dermatomal levels determined by alcohol wipe  Ease of block: easy  Patient's tolerance of the procedure: comfortable throughout block and no complaints  Additional Notes  Epidural dosed with:  Ropivacaine 0.2% x 5/5 mls.  No s/s of systemic injection.  No signs or symptoms of systemic injection.  No paresthesias or pain. No inadvertent dural puncture with Tuohy.  Dural puncture not performed with spinal needle

## 2020-03-20 NOTE — NURSING
Call to anesthesia MD to report patient complaints of numbness in fingers.  Will turn pump off for 30 mins then call MD back.

## 2020-03-20 NOTE — L&D DELIVERY NOTE
[facility]  Vaginal Delivery   Operative Note    SUMMARY   Patient got to complete dilation and only pushed for 1 contraction.  No episiotomy was performed.  You could see the head rotate from OP to OA position as she pushed the baby out.  After the head delivered, the shoulders cleared easily followed by the rest of the infant, which is a viable female infant.  The nares and mouth were then suctioned and the baby was handed off to the mom.  Once the cord stopped pulsating, it was clamped by me and cut by me also.  Cord blood was obtained for the nursery.  The placenta delivered in the Schultze presentation was complete and intact and had a large clot of about 300 cc on the edge of the placenta for about a 2-3% abruption.  Uterus was quite firm.  The perineum was intact with no lacerations.  Blood loss is 300cc total    Indications:  premature rupture of membranes with onset of labor within 24 hours of rupture  Pregnancy complicated by:   Patient Active Problem List   Diagnosis    Admitted to labor and delivery     premature rupture of membranes with onset of labor within 24 hours of rupture     Admitting GA: 36w5d    This patient has no babies on file.

## 2020-03-20 NOTE — PROGRESS NOTES
[facility]  Obstetrics  Labor Progress Note    Patient Name: Katja Trujillo  MRN: [id number]  Admission Date: 3/20/2020  Hospital Length of Stay: 0 days  Attending Physician: Jenny Tuttle MD  Primary Care Provider: Jenny Tuttle MD    Subjective:     Principal Problem: premature rupture of membranes with onset of labor within 24 hours of rupture    Hospital Course:  33yo  admitted with SROM at 36.5 wks. Started on pitocin.    Interval History:  Katja is a 34 y.o.  at 36w5d. She is doing well. Epidural in place . Had a small clot that came out.  Upon exam, there was still a forebag, that I used amnio hook, and had bloody fluid return.    Objective:     Vital Signs (Most Recent):  Temp: 98.3 °F (36.8 °C) (20 1300)  Pulse: 105 (20 1245)  Resp: 16 (20 1300)  BP: 114/61 (20 1245) Vital Signs (24h Range):  Temp:  [98.2 °F (36.8 °C)-99.1 °F (37.3 °C)] 98.3 °F (36.8 °C)  Pulse:  [] 105  Resp:  [16-18] 16  BP: ()/(56-80) 114/61     Weight: 61.2 kg (135 lb)  Body mass index is 26.37 kg/m².    FHT: 140's Cat 1 (reassuring) with early decels  TOCO:  Q 2-4 minutes    Cervical Exam:  Dilation:  7  Effacement:  100%  Station: -1  Presentation: Vertex     Significant Labs:  Lab Results   Component Value Date    GROUPTRH B POS 2020    HEPBSAG Negative 10/16/2019    STREPBCULT positive 2020       I have personallly reviewed all pertinent lab results from the last 24 hours.    Physical Exam    Assessment/Plan:     34 y.o. female  at 36w5d for:    *  premature rupture of membranes with onset of labor within 24 hours of rupture  IUP 36.5 wks  Completed AROM with amnio hook  Expect           Jenny Tuttle MD  Obstetrics  [facility]

## 2020-03-20 NOTE — PLAN OF CARE
POC discussed with patient;  utilized to discuss labor plans, concerns. Time allowed for questions from patient.

## 2020-03-20 NOTE — PROGRESS NOTES
[facility]  Obstetrics  Labor Progress Note    Patient Name: Kajta Trujillo  MRN: [id number]  Admission Date: 3/20/2020  Hospital Length of Stay: 0 days  Attending Physician: Jenny Tuttle MD  Primary Care Provider: Jenny Tuttle MD    Subjective:     Principal Problem: premature rupture of membranes with onset of labor within 24 hours of rupture    Hospital Course:  35yo  admitted with SROM at 36.5 wks. Started on pitocin.    Interval History:  Katja is a 34 y.o.  at 36w5d. She is doing well. On pitocin.    Objective:     Vital Signs (Most Recent):  Temp: 99 °F (37.2 °C) (20 07)  Pulse: 89 (20)  Resp: 16 (20)  BP: 104/69 (20) Vital Signs (24h Range):  Temp:  [98.2 °F (36.8 °C)-99 °F (37.2 °C)] 99 °F (37.2 °C)  Pulse:  [78-89] 89  Resp:  [16-18] 16  BP: ()/(61-79) 104/69     Weight: 61.2 kg (135 lb)  Body mass index is 26.37 kg/m².    FHT: 130'sCat 1 (reassuring)  TOCO:  Q 2-6 minutes    Cervical Exam:  Dilation:  3  Effacement:  80%  Station: -2  Presentation: Vertex     Significant Labs:  Lab Results   Component Value Date    GROUPTRH B POS 2020    HEPBSAG Negative 10/16/2019    STREPBCULT positive 2020       I have personallly reviewed all pertinent lab results from the last 24 hours.    Physical Exam    Assessment/Plan:     34 y.o. female  at 36w5d for:    *  premature rupture of membranes with onset of labor within 24 hours of rupture  IUP 36.5 wks  SROM-clear  GBBS on Ancef expect   Tubal only if C/S          Jenny Tuttle MD  Obstetrics  [facility]

## 2020-03-20 NOTE — ANESTHESIA PREPROCEDURE EVALUATION
2020  Katja Trujillo is a 34 y.o., female.  Patient Active Problem List   Diagnosis    Admitted to labor and delivery     premature rupture of membranes with onset of labor within 24 hours of rupture       History reviewed. No pertinent surgical history.     Tobacco Use:  The patient  reports that she has never smoked. She has never used smokeless tobacco.     No results found for this or any previous visit.          Lab Results   Component Value Date    WBC 7.96 2020    HGB 13.3 2020    HCT 40.3 2020    MCV 82 2020     2020     BMP  No results found for: NA, K, CL, CO2, BUN, CREATININE, CALCIUM, ANIONGAP, ESTGFRAFRICA, EGFRNONAA      Pre-op Assessment    I have reviewed the Patient Summary Reports.    I have reviewed the Nursing Notes.   I have reviewed the Medications.     Review of Systems  Anesthesia Hx:  No problems with previous Anesthesia  Denies Family Hx of Anesthesia complications.   Denies Personal Hx of Anesthesia complications.   Social:  Non-Smoker    Hematology/Oncology:  Hematology Normal       -- Coag Disorders: Bleeding Disorder (epistasis mild with blowing nose):   EENT/Dental:EENT/Dental Normal   Cardiovascular:  Cardiovascular Normal     Pulmonary:  Pulmonary Normal    Renal/:  Renal/ Normal     Hepatic/GI:  Hepatic/GI Normal    Musculoskeletal:  Musculoskeletal Normal  Musculoskeletal General/Symptoms: (report some back pain prior to pregnancy, now worse with the pregnancy)    Neurological:  Neurology Normal    Endocrine:  Endocrine Normal    Psych:  Psychiatric Normal           Physical Exam  General:  Well nourished    Airway/Jaw/Neck:  Airway Findings: Mouth Opening: Normal Tongue: Normal  General Airway Assessment: Adult  Mallampati: II  TM Distance: Normal, at least 6 cm  Jaw/Neck Findings:  Neck ROM: Normal ROM      Eyes/Ears/Nose:  Eyes/Ears/Nose Findings:    Dental:  Dental Findings: In tact   Chest/Lungs:  Chest/Lungs Findings: Clear to auscultation, Normal Respiratory Rate     Heart/Vascular:  Heart Findings: Rate: Normal  Rhythm: Regular Rhythm  Sounds: Normal     Abdomen:  Abdomen Findings:     Musculoskeletal:  Musculoskeletal Findings:    Skin:  Skin Findings:     Mental Status:  Mental Status Findings:  Cooperative, Alert and Oriented         Anesthesia Plan  Type of Anesthesia, risks & benefits discussed:  Anesthesia Type:  epidural  Patient's Preference: Epidural  Intra-op Monitoring Plan: standard ASA monitors  Intra-op Monitoring Plan Comments:   Post Op Pain Control Plan: per primary service following discharge from PACU and IV/PO Opioids PRN  Post Op Pain Control Plan Comments:   Induction:    Beta Blocker:         Informed Consent: Patient understands risks and agrees with Anesthesia plan.  Questions answered. Anesthesia consent signed with patient.  ASA Score: 2     Day of Surgery Review of History & Physical:

## 2020-03-20 NOTE — LACTATION NOTE
This note was copied from a baby's chart.  A  New Beginning booklet given and  breastfeeding chapter reviewed via  Dario. ID # [id number]. Discussed:     Supply and Demand:  The more you nurse the baby the more milk you will make.      Feed your baby On Cue at the earliest sign of hunger or need for comfort:  o Sucking on fingers or hands  o Bringing hands toward his mouth  o Rooting or reaching for something to suck on  o Sucking motions with mouth  o Fretful noises  o Crying is a late sign of hunger or comfort.   The baby should be positioned and latched on to the breast correctly  o Chest-to-chest, chin in the breast  o Babys lips are flipped outward  o Babys mouth is stretched open wide like a shout  o Babys sucking should feel like tugging to the mother  - The baby should be drinking at the breast  o You should hear an occasional swallow during the feeding  o Switch breasts when the baby takes himself off the breast or falls asleep  o Keep offering breasts until the baby looks full, no longer gives hunger signs, and stays asleep when placed on his back in the crib  - If the baby is sleepy and wont wake for a feeding, put the baby skin-to-skin dressed in a diaper against the mothers bare chest  - Sleep with your baby near you in the hospital room  - Call the nurse/lactation consultant for additional assistance as needed.   Reviewed late  chapter in booklet, safe sleep, swaddling, bulb suction.  Discussed the adequacy of colostrum.  Instructed on normal  feeding and sleeping patterns.  Encouraged mother to feed the infant on cue, a minimum of 8 times in 24 hours prior to supplementation to promote appropriate breast stimulation for adequate milk supply.  Chooses to supplement via bottle  Safely taught how to feed infant via chosen method.  Demonstrated by nurse and pt return demonstrates proper and safe usage.  States understand and provided appropriate recall of all  information.

## 2020-03-20 NOTE — SUBJECTIVE & OBJECTIVE
Interval History:  Katja is a 34 y.o.  at 36w5d. She is doing well. Epidural in place . Had a small clot that came out.  Upon exam, there was still a forebag, that I used amnio hook, and had bloody fluid return.    Objective:     Vital Signs (Most Recent):  Temp: 98.3 °F (36.8 °C) (20 1300)  Pulse: 105 (20 1245)  Resp: 16 (20 1300)  BP: 114/61 (20 1245) Vital Signs (24h Range):  Temp:  [98.2 °F (36.8 °C)-99.1 °F (37.3 °C)] 98.3 °F (36.8 °C)  Pulse:  [] 105  Resp:  [16-18] 16  BP: ()/(56-80) 114/61     Weight: 61.2 kg (135 lb)  Body mass index is 26.37 kg/m².    FHT: 140's Cat 1 (reassuring) with early decels  TOCO:  Q 2-4 minutes    Cervical Exam:  Dilation:  7  Effacement:  100%  Station: -1  Presentation: Vertex     Significant Labs:  Lab Results   Component Value Date    GROUPTRH B POS 2020    HEPBSAG Negative 10/16/2019    STREPBCULT positive 2020       I have personallly reviewed all pertinent lab results from the last 24 hours.    Physical Exam

## 2020-03-21 LAB
BASOPHILS # BLD AUTO: 0.02 K/UL (ref 0–0.2)
BASOPHILS NFR BLD: 0.2 % (ref 0–1.9)
DIFFERENTIAL METHOD: ABNORMAL
EOSINOPHIL # BLD AUTO: 0 K/UL (ref 0–0.5)
EOSINOPHIL NFR BLD: 0.2 % (ref 0–8)
ERYTHROCYTE [DISTWIDTH] IN BLOOD BY AUTOMATED COUNT: 13.7 % (ref 11.5–14.5)
HCT VFR BLD AUTO: 33.9 % (ref 37–48.5)
HGB BLD-MCNC: 11.3 G/DL (ref 12–16)
IMM GRANULOCYTES # BLD AUTO: 0.05 K/UL (ref 0–0.04)
IMM GRANULOCYTES NFR BLD AUTO: 0.4 % (ref 0–0.5)
LYMPHOCYTES # BLD AUTO: 2.7 K/UL (ref 1–4.8)
LYMPHOCYTES NFR BLD: 20.7 % (ref 18–48)
MCH RBC QN AUTO: 27.8 PG (ref 27–31)
MCHC RBC AUTO-ENTMCNC: 33.3 G/DL (ref 32–36)
MCV RBC AUTO: 83 FL (ref 82–98)
MONOCYTES # BLD AUTO: 0.8 K/UL (ref 0.3–1)
MONOCYTES NFR BLD: 6.1 % (ref 4–15)
NEUTROPHILS # BLD AUTO: 9.4 K/UL (ref 1.8–7.7)
NEUTROPHILS NFR BLD: 72.4 % (ref 38–73)
NRBC BLD-RTO: 0 /100 WBC
PLATELET # BLD AUTO: 200 K/UL (ref 150–350)
PMV BLD AUTO: 11.1 FL (ref 9.2–12.9)
RBC # BLD AUTO: 4.07 M/UL (ref 4–5.4)
WBC # BLD AUTO: 12.94 K/UL (ref 3.9–12.7)

## 2020-03-21 PROCEDURE — 99900035 HC TECH TIME PER 15 MIN (STAT)

## 2020-03-21 PROCEDURE — 94761 N-INVAS EAR/PLS OXIMETRY MLT: CPT

## 2020-03-21 PROCEDURE — 25000242 PHARM REV CODE 250 ALT 637 W/ HCPCS: Performed by: OBSTETRICS & GYNECOLOGY

## 2020-03-21 PROCEDURE — 94640 AIRWAY INHALATION TREATMENT: CPT

## 2020-03-21 PROCEDURE — 12000002 HC ACUTE/MED SURGE SEMI-PRIVATE ROOM

## 2020-03-21 PROCEDURE — 25000003 PHARM REV CODE 250: Performed by: OBSTETRICS & GYNECOLOGY

## 2020-03-21 PROCEDURE — 85025 COMPLETE CBC W/AUTO DIFF WBC: CPT

## 2020-03-21 RX ADMIN — ALBUTEROL SULFATE 2 PUFF: 90 AEROSOL, METERED RESPIRATORY (INHALATION) at 07:03

## 2020-03-21 RX ADMIN — ALBUTEROL SULFATE 2 PUFF: 90 AEROSOL, METERED RESPIRATORY (INHALATION) at 11:03

## 2020-03-21 RX ADMIN — ALBUTEROL SULFATE 2 PUFF: 90 AEROSOL, METERED RESPIRATORY (INHALATION) at 12:03

## 2020-03-21 RX ADMIN — PRENATAL VIT W/ FE FUMARATE-FA TAB 27-0.8 MG 1 TABLET: 27-0.8 TAB at 09:03

## 2020-03-21 RX ADMIN — IBUPROFEN 800 MG: 400 TABLET, FILM COATED ORAL at 07:03

## 2020-03-21 RX ADMIN — SENNOSIDES AND DOCUSATE SODIUM 1 TABLET: 8.6; 5 TABLET ORAL at 07:03

## 2020-03-21 RX ADMIN — IBUPROFEN 800 MG: 400 TABLET, FILM COATED ORAL at 03:03

## 2020-03-21 RX ADMIN — OXYCODONE HYDROCHLORIDE AND ACETAMINOPHEN 1 TABLET: 10; 325 TABLET ORAL at 04:03

## 2020-03-21 RX ADMIN — DOCUSATE SODIUM 200 MG: 100 CAPSULE, LIQUID FILLED ORAL at 09:03

## 2020-03-21 RX ADMIN — ALBUTEROL SULFATE 2 PUFF: 90 AEROSOL, METERED RESPIRATORY (INHALATION) at 03:03

## 2020-03-21 NOTE — PLAN OF CARE
Active in  care. Lochia light. Pain managed well with current medications. Voiding spontaneously. Ambulating ad minla in room. Demonstrates effective latch with breastfeeding.

## 2020-03-21 NOTE — ANESTHESIA POSTPROCEDURE EVALUATION
Anesthesia Post Evaluation    Patient: Katja Trujillo    Procedure(s) Performed: * No procedures listed *    Final Anesthesia Type: epidural    Patient location during evaluation: floor  Patient participation: Yes- Able to Participate  Level of consciousness: awake and alert, oriented and awake  Post-procedure vital signs: reviewed and stable  Pain management: adequate  Airway patency: patent    PONV status at discharge: No PONV  Anesthetic complications: no      Cardiovascular status: blood pressure returned to baseline, hemodynamically stable and stable  Respiratory status: unassisted, spontaneous ventilation and room air  Hydration status: euvolemic  Follow-up not needed.  Comments: The patient was found to be awake alert and oriented x4.  The patient conveyed that her legs feel normal at this time and was able to demonstrate good motor and sensory to her lower extremities at this time.  The patient denied headache or back ache at this time.  The epidural site was examined and found to be clean and dry without evidence of bleeding, infection or hematoma formation.  She was instructed to notify the Department of Anesthesiology with any questions, problems or concerns.  The patient demonstrates no anesthesia complications at this time          Vitals Value Taken Time   /72 3/21/2020  7:01 AM   Temp 36.7 °C (98 °F) 3/21/2020  7:01 AM   Pulse 72 3/21/2020  7:26 AM   Resp 18 3/21/2020  7:26 AM   SpO2 99 % 3/21/2020  7:26 AM         No case tracking events are documented in the log.      Pain/Harry Score: Pain Rating Prior to Med Admin: 7 (3/21/2020  4:11 AM)  Pain Rating Post Med Admin: 0 (3/20/2020  8:40 PM)  Harry Score: 10 (3/20/2020  3:25 PM)

## 2020-03-21 NOTE — PROGRESS NOTES
Postpartum Day 1 Vaginal Delivery    Katja Trujillo is doing well without complaints. She denies any problems with pp blues, is ambulating well, is voiding without difficulty, and states bleeding is not heavy. Her pain level is being controlled with pain meds.      OBJECTIVE:     Vital Signs (Most Recent):  /72 (BP Location: Right arm, Patient Position: Lying)   Pulse 72   Temp 98 °F (36.7 °C) (Oral)   Resp 18   Ht 5' (1.524 m)   Wt 61.2 kg (135 lb)   LMP 2019 (Exact Date)   SpO2 99%   Breastfeeding? Yes   BMI 26.37 kg/m²       Vital Signs Range (Last 24H):  Reviewed    I & O (Last 24H):  Intake/Output Summary (Last 24 hours)    Intake/Output Summary (Last 24 hours) at 3/21/2020 0850  Last data filed at 3/20/2020 1730  Gross per 24 hour   Intake --   Output 2460 ml   Net -2460 ml         Physical Exam:    Abd: soft non tender, fundus firm   Ext: negative chevy's sign, minimal edema  Lochia: appropriate amount rubra      CBC:   Lab Results   Component Value Date/Time    WBC 12.94 (H) 2020 04:25 AM    RBC 4.07 2020 04:25 AM    HGB 11.3 (L) 2020 04:25 AM    HCT 33.9 (L) 2020 04:25 AM     2020 04:25 AM    MCV 83 2020 04:25 AM    MCH 27.8 2020 04:25 AM    MCHC 33.3 2020 04:25 AM       ABO/Rh  B POS          ASSESSMENT/PLAN:     S/p vaginal delivery   Patient Active Problem List   Diagnosis    Admitted to labor and delivery     premature rupture of membranes with onset of labor within 24 hours of rupture        Routine postpartal care   If baby ready for discharge may consider discharge this p.m.

## 2020-03-21 NOTE — LACTATION NOTE
This note was copied from a baby's chart.  Mom reports baby feeding and supplementing with formula, denies pain or nipple pain, baby not in room at this time, assisted offered as needed, instructed to call when needed. Verb understanding.

## 2020-03-21 NOTE — PLAN OF CARE
Patient medicated x 2 this shift for pain. Minimal bleeding. Voiding without difficulty.Instructed patient on several occasions to call prior to feedings.Luisa used for communication. Plan of care continued.

## 2020-03-21 NOTE — PLAN OF CARE
03/21/20 0726   Patient Assessment/Suction   Level of Consciousness (AVPU) alert   Respiratory Effort Normal;Unlabored   All Lung Fields Breath Sounds clear   PRE-TX-O2   O2 Device (Oxygen Therapy) room air   SpO2 99 %   Pulse Oximetry Type Intermittent   $ Pulse Oximetry - Multiple Charge Pulse Oximetry - Multiple   Pulse 72   Resp 18   Inhaler   $ Inhaler Charges MDI (Metered Dose Inahler) Treatment   Daily Review of Necessity (Inhaler) completed   Respiratory Treatment Status (Inhaler) given   Treatment Route (Inhaler) spacer/holding chamber   Patient Position (Inhaler) HOB elevated   Post Treatment Assessment (Inhaler) breath sounds improved   Signs of Intolerance (Inhaler) none   Respiratory Evaluation   $ Care Plan Tech Time 15 min

## 2020-03-21 NOTE — CARE UPDATE
03/21/20 0042   Patient Assessment/Suction   Level of Consciousness (AVPU) alert   Respiratory Effort Normal;Unlabored   Expansion/Accessory Muscles/Retractions no use of accessory muscles   All Lung Fields Breath Sounds Anterior:;clear   PRE-TX-O2   O2 Device (Oxygen Therapy) room air   SpO2 97 %   Pulse Oximetry Type Intermittent   Pulse 63   Resp 18   Positioning   Body Position positioned/repositioned independently   Inhaler   $ Inhaler Charges MDI (Metered Dose Inahler) Treatment;Spacer - Equipment   Daily Review of Necessity (Inhaler) completed   Respiratory Treatment Status (Inhaler) given   Treatment Route (Inhaler) mouthpiece;spacer/holding chamber   Patient Position (Inhaler) HOB elevated   Post Treatment Assessment (Inhaler) breath sounds unchanged   Signs of Intolerance (Inhaler) none   Breath Sounds Post-Respiratory Treatment   Throughout All Fields Post-Treatment All Fields   Throughout All Fields Post-Treatment no change   Post-treatment Heart Rate (beats/min) 63   Post-treatment Resp Rate (breaths/min) 18   Respiratory Evaluation   $ Care Plan Tech Time 15 min

## 2020-03-22 VITALS
DIASTOLIC BLOOD PRESSURE: 72 MMHG | HEART RATE: 74 BPM | RESPIRATION RATE: 18 BRPM | TEMPERATURE: 98 F | OXYGEN SATURATION: 99 % | SYSTOLIC BLOOD PRESSURE: 105 MMHG | WEIGHT: 135 LBS | HEIGHT: 60 IN | BODY MASS INDEX: 26.5 KG/M2

## 2020-03-22 LAB — BACTERIA UR CULT: NO GROWTH

## 2020-03-22 PROCEDURE — 94640 AIRWAY INHALATION TREATMENT: CPT

## 2020-03-22 PROCEDURE — 25000003 PHARM REV CODE 250: Performed by: OBSTETRICS & GYNECOLOGY

## 2020-03-22 RX ADMIN — PRENATAL VIT W/ FE FUMARATE-FA TAB 27-0.8 MG 1 TABLET: 27-0.8 TAB at 11:03

## 2020-03-22 RX ADMIN — IBUPROFEN 800 MG: 400 TABLET, FILM COATED ORAL at 11:03

## 2020-03-22 RX ADMIN — ALBUTEROL SULFATE 2 PUFF: 90 AEROSOL, METERED RESPIRATORY (INHALATION) at 08:03

## 2020-03-22 NOTE — DISCHARGE SUMMARY
[facility]  Obstetrics  Discharge Summary      Patient Name: Katja Trujillo  MRN: [id number]  Admission Date: 3/20/2020  Hospital Length of Stay: 2 days  Discharge Date and Time:  2020 11:16 AM  Attending Physician: Norma Bustamante MD   Discharging Provider: Hayden Jones MD   Primary Care Provider: Norma Bustamante MD    HPI: No notes on file        * No surgery found *     Hospital Course:   35yo  admitted with SROM at 36.5 wks. Started on pitocin. Received an epidural for her labor.She underwent  without any complications. She is GBBS positive.      Consults (From admission, onward)        Status Ordering Provider     Consult to Lactation  Use PRN     Provider:  (Not yet assigned)    Acknowledged NORMA BUSTAMANTE          Final Active Diagnoses:    Diagnosis Date Noted POA    PRINCIPAL PROBLEM:   premature rupture of membranes with onset of labor within 24 hours of rupture [O42.019] 2020 Unknown    Admitted to labor and delivery [Z78.9] 2020 Yes      Problems Resolved During this Admission:        Labs:   CBC   Recent Labs   Lab 20  0425   WBC 12.94*   HGB 11.3*   HCT 33.9*              Immunizations     Date Immunization Status Dose Route/Site Given by    20 Influenza - Quadrivalent - PF (6 months and older) Incomplete 0.5 mL Intramuscular/Left deltoid           Delivery:    Episiotomy: None   Lacerations: None   Repair suture: None   Repair # of packets:     Blood loss (ml): 300     Birth information:  YOB: 2019   Time of birth: 3:04 PM   Sex: female   Delivery type: Vaginal, Spontaneous   Gestational Age: 36w5d    Delivery Clinician:      Other providers:       Additional  information:  Forceps:    Vacuum:    Breech:    Observed anomalies      Living?:           APGARS  One minute Five minutes Ten minutes   Skin color:         Heart rate:         Grimace:         Muscle tone:         Breathing:         Totals: 9  9         Placenta: Delivered:       appearance    Pending Diagnostic Studies:     Procedure Component Value Units Date/Time    Specimen to Pathology - Surgery [[id number]] Collected:  03/20/20 1544    Order Status:  Sent Lab Status:  No result     Specimen:  Tissue           Discharged Condition: good    Disposition: Home or Self Care    Follow Up:  Follow-up Information     Follow up In 6 weeks.               Patient Instructions:      Diet Adult Regular     Notify your health care provider if you experience any of the following:  temperature >100.4     Notify your health care provider if you experience any of the following:   Order Comments: Foul odorous discharge, profuse bleeding, wound breakdown/issues     Remove dressing in 24 hours     Pelvic Rest     Other restrictions (specify):   Order Comments: Pelvic rest x 6 weeks until MD appointment     Medications:  Current Discharge Medication List      CONTINUE these medications which have NOT CHANGED    Details   albuterol (PROVENTIL/VENTOLIN HFA) 90 mcg/actuation inhaler Inhale 1-2 puffs into the lungs every 4 (four) hours as needed for Wheezing (cough). Rescue  Qty: 1 Inhaler, Refills: 0         STOP taking these medications       prenatal vit/iron fum/folic ac (PRENATAL 1+1 ORAL) Comments:   Reason for Stopping:               Hayden Jones MD  Obstetrics  [facility]

## 2020-03-22 NOTE — PROGRESS NOTES
Postpartum Day 2 Vaginal Delivery    Katja Trujillo is doing well without complaints. She denies any problems with pp blues, is ambulating well, is voiding without difficulty, and states bleeding is not heavy. Her pain level is being controlled with pain meds.      OBJECTIVE:     Vital Signs (Most Recent):  /72   Pulse 74   Temp 98.4 °F (36.9 °C)   Resp 18   Ht 5' (1.524 m)   Wt 61.2 kg (135 lb)   LMP 2019 (Exact Date)   SpO2 99%   Breastfeeding? Yes   BMI 26.37 kg/m²       Vital Signs Range (Last 24H):  Reviewed    I & O (Last 24H):  Intake/Output Summary (Last 24 hours)  No intake or output data in the 24 hours ending 20 1112      Physical Exam:    Abd: soft non tender, fundus firm   Ext: negative chevy's sign, minimal edema  Lochia: appropriate amount rubra      CBC:   Lab Results   Component Value Date/Time    WBC 12.94 (H) 2020 04:25 AM    RBC 4.07 2020 04:25 AM    HGB 11.3 (L) 2020 04:25 AM    HCT 33.9 (L) 2020 04:25 AM     2020 04:25 AM    MCV 83 2020 04:25 AM    MCH 27.8 2020 04:25 AM    MCHC 33.3 2020 04:25 AM       ABO/Rh  B POS     S/p vag delivery   Patient Active Problem List   Diagnosis    Admitted to labor and delivery     premature rupture of membranes with onset of labor within 24 hours of rupture          D/c home  See discharge orders  Follow up 4-6 weeks  Emergency room precautions

## 2020-03-22 NOTE — PLAN OF CARE
VSS- pt progressing well- with  verbalizes pain well controlled- bleeding wnl- with  pt discharge instructions given- pt verbalizes understanding of teaching- pt dc'd home with all instructions and belongings

## 2020-03-22 NOTE — PLAN OF CARE
Pt stable throughout night. VSS, pain well controlled, voiding without difficulty, fundus firm, and lochia light. Will cont to monitor.      Natali Benz RN  03/22/2020  3:27 AM

## 2020-03-22 NOTE — PLAN OF CARE
03/21/20 2311   Patient Assessment/Suction   Respiratory Effort Unlabored   All Lung Fields Breath Sounds clear   Rhythm/Pattern, Respiratory pattern regular   PRE-TX-O2   O2 Device (Oxygen Therapy) room air   SpO2 98 %   Pulse 66   Resp 16   Inhaler   $ Inhaler Charges MDI (Metered Dose Inahler) Treatment;Given With Spacer   Respiratory Treatment Status (Inhaler) given   Treatment Route (Inhaler) mouthpiece;breath hold   Patient Position (Inhaler) HOB elevated   Post Treatment Assessment (Inhaler) breath sounds unchanged   Signs of Intolerance (Inhaler) none   Breath Sounds Post-Respiratory Treatment   Throughout All Fields Post-Treatment All Fields   Throughout All Fields Post-Treatment no change   Post-treatment Heart Rate (beats/min) 66   Post-treatment Resp Rate (breaths/min) 19

## 2020-03-22 NOTE — LACTATION NOTE
This note was copied from a baby's chart.  Mom states baby breast and bottle feeding, denies pain, states small amount of milk, explained supply and demand milk production and to offer breast before bottle, mom verb understanding. Instructed mom on bottle feeding techniques, and burping, mom verb understanding, assisted with diaper change and dressing baby, demonstrated swaddling infant and discussed safe sleep, mom verb understanding, infant to nursery for car seat test, assistance offered PRN. Will call.

## 2021-03-01 PROBLEM — O42.019 PRETERM PREMATURE RUPTURE OF MEMBRANES WITH ONSET OF LABOR WITHIN 24 HOURS OF RUPTURE: Status: RESOLVED | Noted: 2020-03-20 | Resolved: 2021-03-01

## 2023-08-13 ENCOUNTER — HOSPITAL ENCOUNTER (EMERGENCY)
Facility: HOSPITAL | Age: 38
Discharge: HOME | End: 2023-08-13
Attending: EMERGENCY MEDICINE

## 2023-08-13 VITALS
RESPIRATION RATE: 16 BRPM | OXYGEN SATURATION: 97 % | TEMPERATURE: 98 F | HEART RATE: 83 BPM | SYSTOLIC BLOOD PRESSURE: 123 MMHG | DIASTOLIC BLOOD PRESSURE: 87 MMHG

## 2023-08-13 DIAGNOSIS — H92.02 OTALGIA OF LEFT EAR: Primary | ICD-10-CM

## 2023-08-13 DIAGNOSIS — R51.9 FACIAL PAIN: ICD-10-CM

## 2023-08-13 LAB
B-HCG UR QL: NEGATIVE
CTP QC/QA: YES

## 2023-08-13 PROCEDURE — 99285 EMERGENCY DEPT VISIT HI MDM: CPT | Mod: 25

## 2023-08-13 PROCEDURE — 96372 THER/PROPH/DIAG INJ SC/IM: CPT | Performed by: EMERGENCY MEDICINE

## 2023-08-13 PROCEDURE — 63600175 PHARM REV CODE 636 W HCPCS: Performed by: EMERGENCY MEDICINE

## 2023-08-13 PROCEDURE — 81025 URINE PREGNANCY TEST: CPT | Performed by: EMERGENCY MEDICINE

## 2023-08-13 RX ORDER — KETOROLAC TROMETHAMINE 30 MG/ML
15 INJECTION, SOLUTION INTRAMUSCULAR; INTRAVENOUS
Status: COMPLETED | OUTPATIENT
Start: 2023-08-13 | End: 2023-08-13

## 2023-08-13 RX ORDER — AMOXICILLIN 875 MG/1
875 TABLET, FILM COATED ORAL 2 TIMES DAILY
Qty: 14 TABLET | Refills: 0 | Status: SHIPPED | OUTPATIENT
Start: 2023-08-13

## 2023-08-13 RX ORDER — NAPROXEN 500 MG/1
500 TABLET ORAL 2 TIMES DAILY WITH MEALS
Qty: 30 TABLET | Refills: 0 | Status: SHIPPED | OUTPATIENT
Start: 2023-08-13

## 2023-08-13 RX ADMIN — KETOROLAC TROMETHAMINE 15 MG: 30 INJECTION, SOLUTION INTRAMUSCULAR; INTRAVENOUS at 12:08

## 2023-08-13 NOTE — ED PROVIDER NOTES
Encounter Date: 8/13/2023       History     Chief Complaint   Patient presents with    Otalgia     Left, headache     Patient presents complaining of left ear pain and discomfort.  Patient having intermittent facial pain as well and sometimes occasional headaches.  Symptoms have been present for last few days.  Patient tried muscle relaxer without relief.  The worst symptoms are mild-to-moderate.  She denies any one-sided numbness tingling weakness.  She denies fever.  History provided by the  on [facility].      Review of patient's allergies indicates:  No Known Allergies  No past medical history on file.  No past surgical history on file.  No family history on file.     Review of Systems   All other systems reviewed and are negative.      Physical Exam     Initial Vitals [08/13/23 1106]   BP Pulse Resp Temp SpO2   (!) 128/93 91 14 98.3 °F (36.8 °C) 100 %      MAP       --         Physical Exam    Nursing note and vitals reviewed.  Constitutional: She appears well-developed and well-nourished.   Pleasant, polite   HENT:   Head: Normocephalic and atraumatic.   There is mild fullness to the left ear TM.   Eyes: EOM are normal.   Neck: Neck supple.   Normal range of motion.  Cardiovascular:  Intact distal pulses.           Pulmonary/Chest: Breath sounds normal. No respiratory distress.   Musculoskeletal:      Cervical back: Normal range of motion and neck supple.     Neurological: She is alert and oriented to person, place, and time.   Skin: Skin is warm and dry. Capillary refill takes less than 2 seconds.   Psychiatric: She has a normal mood and affect. Her behavior is normal. Judgment and thought content normal.         ED Course   Procedures  Labs Reviewed   POCT URINE PREGNANCY          Imaging Results              CT Head Without Contrast (Final result)  Result time 08/13/23 13:00:10      Final result by Steve Montaño MD (08/13/23 13:00:10)                   Impression:      No CT evidence of  acute intracranial pathology.      Electronically signed by: Steve Montaño MD  Date:    08/13/2023  Time:    13:00               Narrative:    EXAMINATION:  CT HEAD WITHOUT CONTRAST    CLINICAL HISTORY:  Headache, chronic, new features or increased frequency;    TECHNIQUE:  CMS Mandated Quality Data-CT Radiation Dose-436    All CT scans at this facility dose modulation, iterative reconstruction, and or weight-based dosing when appropriate to reduce radiation dose to as low as reasonably achievable.    COMPARISON:  None available    FINDINGS:  Negative for acute intracranial hemorrhage, midline shift, or mass effect.  Ventricles and sulci are normal in size.  Gray-white differentiation is maintained.  Cerebellar hemispheres and brainstem are unremarkable.    No calvarial lesion or fracture.  Mastoid air cells are clear.  Paranasal sinuses are clear.                                       Medications   ketorolac injection 15 mg (15 mg Intramuscular Given 8/13/23 6353)     Medical Decision Making:   Initial Assessment:   Patient is in no distress  Differential Diagnosis:   Otitis media, trigeminal neuralgia, less likely intracranial hemorrhage, less likely tumor  Clinical Tests:   Radiological Study: Reviewed and Ordered  ED Management:  [facility]    Patient presents for emergent evaluation of acute headache, facial pain, ear pain that poses a threat to life and/or bodily function.    In the ED patient found to have acute otitis media.      I ordered CT scan and personally reviewed it and reviewed the radiologist interpretation.  CT significant for no acute abnormality.      Discharge [facility]    Patient was managed in the ED with IM Toradol.    The response to treatment was no change.  Patient was advised anti-inflammatory as well as antibiotic for otitis media.  She was advised to follow up primary care.  Patient's head CT shows no acute abnormality.  Patient was discharged in stable condition.  Detailed return precautions  discussed.                          Clinical Impression:   Final diagnoses:  [H92.02] Otalgia of left ear (Primary)  [R51.9] Facial pain        ED Disposition Condition    Discharge Stable          ED Prescriptions       Medication Sig Dispense Start Date End Date Auth. Provider    naproxen (NAPROSYN) 500 MG tablet Take 1 tablet (500 mg total) by mouth 2 (two) times daily with meals. 30 tablet 8/13/2023 -- Rajiv Rivero MD    amoxicillin (AMOXIL) 875 MG tablet Take 1 tablet (875 mg total) by mouth 2 (two) times daily. 14 tablet 8/13/2023 -- Rajiv Rivero MD          Follow-up Information    None          Rajiv Rivero MD  08/13/23 4075